# Patient Record
(demographics unavailable — no encounter records)

---

## 2019-06-04 NOTE — EMERGENCY DEPARTMENT REPORT
ED Pregnancy HPI





- General


Chief complaint: Nausea/Vomiting/Diarrhea


Stated complaint: 10WKS/NAUSEA


Time Seen by Provider: 19 08:30


Source: patient, EMS


Mode of arrival: Wheelchair


Limitations: No Limitations





- History of Present Illness


Initial comments: 





28-year-old female , currently 10 weeks pregnant, presents to the ED with 

nausea and vomiting during her entire pregnancy, worsening over the last 3 days.

 The patient denies fever, abdominal pain.  Patient reports she is unable to 

keep anything down.  Patient states she contacted her OB/GYN and they were 

supposed to call in a prescription for nausea medicine, however when she arrived

to the pharmacy it had not been called in.  Patient reports generalized 

weakness, cramping in her hands.





OB: [facility]


MD Complaint: other (vomiting)


-: days(s) (3)


Severity: severe


Consistency: constant


Improves with: none


Worsens with: eating


Associated symptoms: nausea/vomiting.  denies: vaginal bleeding


Vaginal bleeding: none


Pregnant:: Yes


Number of weeks pregnant: 10


Pre- care: followed by OB ([facility])





- Related Data


: 6


Para: 3


Ab: 2


                                  Previous Rx's











 Medication  Instructions  Recorded  Last Taken  Type


 


Promethazine [Phenergan TAB] 25 mg PO Q6HR PRN #20 tab 19 Unknown Rx


 


Promethazine [Phenergan] 25 mg ID Q6HR PRN #10 supp.rect 19 Unknown Rx











                                    Allergies











Allergy/AdvReac Type Severity Reaction Status Date / Time


 


No Known Allergies Allergy   Verified 19 07:58














ED Review of Systems


ROS: 


Stated complaint: 10WKS/NAUSEA


Other details as noted in HPI





Comment: All other systems reviewed and negative


Constitutional: denies: chills, fever


Gastrointestinal: nausea, vomiting.  denies: abdominal pain, diarrhea


Genitourinary: other (denies vag bleeding)





ED Past Medical Hx





- Social History


Smoking Status: Never Smoker


Substance Use Type: None





- Medications


Home Medications: 


                                Home Medications











 Medication  Instructions  Recorded  Confirmed  Last Taken  Type


 


Promethazine [Phenergan TAB] 25 mg PO Q6HR PRN #20 tab 19  Unknown Rx


 


Promethazine [Phenergan] 25 mg ID Q6HR PRN #10 supp.rect 19  Unknown Rx














ED Physical Exam





- General


Limitations: No Limitations


General appearance: alert, in no apparent distress





- Head


Head exam: Present: atraumatic, normocephalic





- Eye


Eye exam: Present: normal appearance





- ENT


ENT exam: Present: mucous membranes moist





- Neck


Neck exam: Present: normal inspection





- Respiratory


Respiratory exam: Present: normal lung sounds bilaterally.  Absent: respiratory 

distress





- Cardiovascular


Cardiovascular Exam: Present: normal rhythm, tachycardia





- GI/Abdominal


GI/Abdominal exam: Present: soft.  Absent: distended, tenderness





- Extremities Exam


Extremities exam: Present: normal inspection





- Neurological Exam


Neurological exam: Present: alert, oriented X3





- Psychiatric


Psychiatric exam: Present: normal affect, normal mood





- Skin


Skin exam: Present: warm, dry, intact, normal color





ED Course


                                   Vital Signs











  19





  08:00 09:10 12:16


 


Temperature 98.3 F  


 


Pulse Rate 108 H  82


 


Respiratory 22 15 16





Rate   


 


Blood Pressure 114/70  


 


Blood Pressure   104/65





[Right]   


 


O2 Sat by Pulse 100  99





Oximetry   














- Reevaluation(s)


Reevaluation #1: 





19 12:06


Pt has received 2L bolus NS.  IV zofran.  Tachycardia resolved.  Potassium 2.8. 

 Potassium repleted.   Tolerated PO challenge.  Remains free of abdominal pain. 

Will d/c home at this time. Pt advised to f/u with her OB/GYN














ED Medical Decision Making





- Lab Data


Result diagrams: 


                                 19 08:41





                                 19 08:41





- Differential Diagnosis


hypokalemia, dehydration, hyperemesis


Critical care attestation.: 


If time is entered above; I have spent that time in minutes in the direct care 

of this critically ill patient, excluding procedure time.








ED Disposition


Clinical Impression: 


 Hyperemesis gravidarum, Hypokalemia, Dehydration





Disposition: -01 TO HOME OR SELFCARE


Is pt being admited?: No


Condition: Stable


Instructions:  Hyperemesis Gravidarum (ED), Dehydration (ED), Hypokalemia (ED)


Prescriptions: 


Promethazine [Phenergan TAB] 25 mg PO Q6HR PRN #20 tab


 PRN Reason: Nausea


Promethazine [Phenergan] 25 mg ID Q6HR PRN #10 supp.rect


 PRN Reason: Nausea


Referrals: 


PRIMARY CARE,MD [Referring] - 3-5 Days


Time of Disposition: 12:12